# Patient Record
Sex: MALE | Race: OTHER | URBAN - METROPOLITAN AREA
[De-identification: names, ages, dates, MRNs, and addresses within clinical notes are randomized per-mention and may not be internally consistent; named-entity substitution may affect disease eponyms.]

---

## 2021-12-06 NOTE — PATIENT DISCUSSION
[facility] monitoring, AREDS2 vitamins, no smoking, green leafy vegetables discussed.

## 2021-12-06 NOTE — PATIENT DISCUSSION
s/p Injection by Dr. Chuy Teran 4 days ago. It was emphasized to the patient that their vision will still be limited by the macular degeneration even after successful cataract surgery and the changes in his retina are causing most of the poor visual acuity in the right eye based on cataract grading and his best corrected vision of 20/400 OD and 20/40 OS.

## 2021-12-06 NOTE — PATIENT DISCUSSION
Discussed findings, not visually significant at this time. The cataract grading is equal in both eyes which indicates the visual acuity is likely due to his macular degeneration. Re-evaluate in 3 months. Request clearance letter from Dr. Mayank Perez.

## 2023-04-11 ENCOUNTER — CONSULTATION/EVALUATION (OUTPATIENT)
Facility: LOCATION | Age: 78
End: 2023-04-11

## 2023-04-11 VITALS — WEIGHT: 208 LBS | BODY MASS INDEX: 30.81 KG/M2 | HEIGHT: 69 IN

## 2023-04-11 DIAGNOSIS — H25.813: ICD-10-CM

## 2023-04-11 PROCEDURE — 92136 OPHTHALMIC BIOMETRY: CPT

## 2023-04-11 PROCEDURE — 92134 CPTRZ OPH DX IMG PST SGM RTA: CPT

## 2023-04-11 PROCEDURE — 99204 OFFICE O/P NEW MOD 45 MIN: CPT

## 2023-04-11 ASSESSMENT — VISUAL ACUITY
OU_CC: 20/20-1
OD_SC: J16
OD_CC: 20/20-1
OD_BAT: 20/40
OD_SC: 20/150
OS_BAT: 20/100
OU_SC: 20/100-2
OD_CC: J1+-1
OS_CC: J1+
OS_CC: 20/50+2
OS_SC: 20/150
OU_SC: J16
OU_CC: J1+-2
OS_SC: J16-1

## 2023-04-11 ASSESSMENT — TONOMETRY
OS_IOP_MMHG: 19
OD_IOP_MMHG: 17

## 2023-05-31 ENCOUNTER — POST-OP (OUTPATIENT)
Facility: LOCATION | Age: 78
End: 2023-05-31

## 2023-05-31 DIAGNOSIS — Z96.1: ICD-10-CM

## 2023-05-31 PROCEDURE — 99024 POSTOP FOLLOW-UP VISIT: CPT

## 2023-05-31 ASSESSMENT — VISUAL ACUITY
OS_PH: 20/30
OS_SC: 20/50+1
OD_SC: 20/400

## 2023-05-31 ASSESSMENT — TONOMETRY
OS_IOP_MMHG: 15
OD_IOP_MMHG: 20

## 2023-06-13 ENCOUNTER — POST-OP (OUTPATIENT)
Facility: LOCATION | Age: 78
End: 2023-06-13

## 2023-06-13 DIAGNOSIS — H25.811: ICD-10-CM

## 2023-06-13 DIAGNOSIS — Z96.1: ICD-10-CM

## 2023-06-13 PROCEDURE — 99024 POSTOP FOLLOW-UP VISIT: CPT

## 2023-06-13 ASSESSMENT — VISUAL ACUITY
OS_SC: J16-1
OD_SC: 20/400
OS_SC: 20/40
OD_SC: 20/150
OS_PH: 20/25

## 2023-06-13 ASSESSMENT — TONOMETRY
OS_IOP_MMHG: 18
OD_IOP_MMHG: 20

## 2023-06-26 ENCOUNTER — SURGERY/PROCEDURE (OUTPATIENT)
Facility: LOCATION | Age: 78
End: 2023-06-26

## 2023-06-26 DIAGNOSIS — H25.811: ICD-10-CM

## 2023-06-26 PROCEDURE — 6698454 REMOVE CATARACT;INSERT LENS (SX ONLY)

## 2023-06-27 ENCOUNTER — POST-OP (OUTPATIENT)
Facility: LOCATION | Age: 78
End: 2023-06-27

## 2023-06-27 DIAGNOSIS — Z96.1: ICD-10-CM

## 2023-06-27 PROCEDURE — 99024 POSTOP FOLLOW-UP VISIT: CPT

## 2023-06-27 ASSESSMENT — VISUAL ACUITY
OD_SC: 20/30
OS_SC: 20/25-2
OU_SC: 20/25

## 2023-06-27 ASSESSMENT — TONOMETRY
OS_IOP_MMHG: 24
OD_IOP_MMHG: 32
OD_IOP_MMHG: 35
OS_IOP_MMHG: 23

## 2023-07-11 ENCOUNTER — POST-OP (OUTPATIENT)
Facility: LOCATION | Age: 78
End: 2023-07-11

## 2023-07-11 DIAGNOSIS — Z96.1: ICD-10-CM

## 2023-07-11 PROCEDURE — 99024 POSTOP FOLLOW-UP VISIT: CPT

## 2023-07-11 ASSESSMENT — TONOMETRY
OD_IOP_MMHG: 19
OS_IOP_MMHG: 14

## 2023-07-11 ASSESSMENT — VISUAL ACUITY
OD_SC: 20/25
OS_SC: 20/25